# Patient Record
Sex: FEMALE | Race: WHITE
[De-identification: names, ages, dates, MRNs, and addresses within clinical notes are randomized per-mention and may not be internally consistent; named-entity substitution may affect disease eponyms.]

---

## 2019-04-18 ENCOUNTER — HOSPITAL ENCOUNTER (INPATIENT)
Dept: HOSPITAL 92 - L&D-LIB | Age: 29
LOS: 1 days | Discharge: HOME | End: 2019-04-19
Attending: OBSTETRICS & GYNECOLOGY | Admitting: OBSTETRICS & GYNECOLOGY
Payer: COMMERCIAL

## 2019-04-18 VITALS — BODY MASS INDEX: 27.7 KG/M2

## 2019-04-18 DIAGNOSIS — Z3A.40: ICD-10-CM

## 2019-04-18 DIAGNOSIS — O48.0: Primary | ICD-10-CM

## 2019-04-18 LAB
HBSAG INDEX: 0.31 S/CO (ref 0–0.99)
HGB BLD-MCNC: 14 G/DL (ref 12–16)
MCH RBC QN AUTO: 30.9 PG (ref 27–31)
MCV RBC AUTO: 93.3 FL (ref 78–98)
PLATELET # BLD AUTO: 264 THOU/UL (ref 130–400)
RBC # BLD AUTO: 4.55 MILL/UL (ref 4.2–5.4)
SYPHILIS ANTIBODY INDEX: 0.03 S/CO
WBC # BLD AUTO: 13.3 THOU/UL (ref 4.8–10.8)

## 2019-04-18 PROCEDURE — 86900 BLOOD TYPING SEROLOGIC ABO: CPT

## 2019-04-18 PROCEDURE — 36415 COLL VENOUS BLD VENIPUNCTURE: CPT

## 2019-04-18 PROCEDURE — 90707 MMR VACCINE SC: CPT

## 2019-04-18 PROCEDURE — 86901 BLOOD TYPING SEROLOGIC RH(D): CPT

## 2019-04-18 PROCEDURE — 86780 TREPONEMA PALLIDUM: CPT

## 2019-04-18 PROCEDURE — 86850 RBC ANTIBODY SCREEN: CPT

## 2019-04-18 PROCEDURE — 90715 TDAP VACCINE 7 YRS/> IM: CPT

## 2019-04-18 PROCEDURE — 0HQ9XZZ REPAIR PERINEUM SKIN, EXTERNAL APPROACH: ICD-10-PCS | Performed by: ADVANCED PRACTICE MIDWIFE

## 2019-04-18 PROCEDURE — 87340 HEPATITIS B SURFACE AG IA: CPT

## 2019-04-18 PROCEDURE — 85027 COMPLETE CBC AUTOMATED: CPT

## 2019-04-18 RX ADMIN — DOCUSATE CALCIUM SCH MG: 240 CAPSULE, LIQUID FILLED ORAL at 22:03

## 2019-04-18 NOTE — PDOC.LDHP
Labor and Delivery H&P


Chief complaint: other (Elective induction of labor at term)


HPI: 





Patient was seen in the office today for routine prenatal exam.  We discussed 

options for expectant management vs IOL by [facility].  After discussing with her 

, she decided to proceed with [facility] and went directly from [facility] to [facility].  She denies CTX, LOF, VB.  The baby has been moving normally.  


Current gestational age (weeks): 40


Due date: 19


Dating criteria: last menstrual period


Grav: 2


Para: 1


OB History Details: 


P1   precipitously.





Current pregnancy complications: none


Current medications: pre-fabien vitamins


Previous surgical history: other (Breast biopsy 2017)


Allergies/Adverse Reactions: 


 Allergies











Allergy/AdvReac Type Severity Reaction Status Date / Time


 


No Known Allergies Allergy   Unverified 19 11:22











Social history: none





- Physical Exam


Vital signs reviewed and normal: yes


General: NAD, resting


Lungs: nonlabored breathing


Abdomen: gravid


FHT: category 1





- Vaginal Exam


cm dilated: 4


Effacement: 90%


Station: 0





- OB Labs


Blood type: O


RH: positive


Antibody Screen: negative


HIV: negative


RPR: negative


HEPSAg: negative


1 hour GCT: negative


GBS: negative


Urine drug screen: negative


Rubella: immune





- Assessment


L&D Assessment: elective induction at term





- Plan


Plan: admit to L&D ([facility] clear fluid.  Low intervention protocol)

## 2019-04-18 NOTE — PDOC.OPDEL
OB Operative/Delivery Note


Delivery Dr/Surgeon: MONICA Winter 


Pre-Delivery Diagnosis: elective induction


Procedure/Post Delivery Dx: spontaneous vaginal delivery


Weeks gestation: 40


Anesthesia: none





- Findings


  ** A


Sex: female


Apgar - 1 min: 8


Apgar - 5 min: 9





- Additional Findings/Plan


Placenta delivered: spontaneous


Repaired Obstetrical Laceration: 1st degree


Estimated blood loss: 250ml.  See nurses documentation for QBL.


Compilations/Other Findings: 





1 degree repaired.





Post delivery plan: routine recovery

## 2019-04-19 VITALS — DIASTOLIC BLOOD PRESSURE: 65 MMHG | SYSTOLIC BLOOD PRESSURE: 116 MMHG | TEMPERATURE: 98.3 F

## 2019-04-19 RX ADMIN — DOCUSATE CALCIUM SCH MG: 240 CAPSULE, LIQUID FILLED ORAL at 09:28
